# Patient Record
Sex: MALE | Race: WHITE | HISPANIC OR LATINO | Employment: FULL TIME | URBAN - METROPOLITAN AREA
[De-identification: names, ages, dates, MRNs, and addresses within clinical notes are randomized per-mention and may not be internally consistent; named-entity substitution may affect disease eponyms.]

---

## 2018-06-01 ENCOUNTER — OFFICE VISIT (OUTPATIENT)
Dept: URGENT CARE | Facility: CLINIC | Age: 53
End: 2018-06-01
Payer: OTHER MISCELLANEOUS

## 2018-06-01 VITALS — DIASTOLIC BLOOD PRESSURE: 100 MMHG | TEMPERATURE: 97 F | HEART RATE: 80 BPM | SYSTOLIC BLOOD PRESSURE: 150 MMHG

## 2018-06-01 DIAGNOSIS — S40.022A CONTUSION OF LEFT UPPER ARM, INITIAL ENCOUNTER: ICD-10-CM

## 2018-06-01 DIAGNOSIS — M79.602 PAIN OF LEFT UPPER EXTREMITY: Primary | ICD-10-CM

## 2018-06-01 DIAGNOSIS — S50.12XA CONTUSION OF LEFT FOREARM, INITIAL ENCOUNTER: ICD-10-CM

## 2018-06-01 DIAGNOSIS — S50.02XA CONTUSION OF LEFT ELBOW, INITIAL ENCOUNTER: ICD-10-CM

## 2018-06-01 PROCEDURE — 99203 OFFICE O/P NEW LOW 30 MIN: CPT | Mod: S$GLB,,, | Performed by: NURSE PRACTITIONER

## 2018-06-01 RX ORDER — ACETAMINOPHEN 325 MG/1
650 TABLET ORAL EVERY 4 HOURS PRN
COMMUNITY
Start: 2018-06-01

## 2018-06-01 NOTE — LETTER
[facility]  [address], [address]  [facility] [address]  Phone: [phone]  Fax: [phone]    Pt Name: Marcus Steinberg  Injury Date: 05/25/2018   Employee ID: [id number] Date of First Treatment: 06/01/2018   Company: [employer]            Appointment Time: 11:35 AM Arrived: 11:50 AM CDT   Physician: Janina Portillo NP Time Out:14:40 PM       Office Treatment: Marcus was seen today for arm pain.    Diagnoses and all orders for this visit:    Pain of left upper extremity  -     X-Ray Elbow Complete Left  -     acetaminophen (TYLENOL) 325 MG tablet; Take 2 tablets (650 mg total) by mouth every 4 (four) hours as needed for Pain.    Contusion of left upper arm, initial encounter  Contusion of left forearm, initial encounter  Contusion of left elbow, initial encounter       Patient Instructions: Attention not to aggravate affected area, Daily home exercises/warm soaks    Restrictions: Limited use of left hand and arm (No lifting over 5 lbs with left arm.)       Return Appointment: 6/8/2018 at 0900

## 2018-06-01 NOTE — PROGRESS NOTES
Subjective:       Patient ID: Marcus Steinberg is a 53 y.o. male.    Chief Complaint: Arm Pain (right)    New work injury (doi 5/25/2018) Pt reports catching a falling bag of frozen chicken nuggets. He c/o left upper arm pain where the 50 lb box of frozen nuggets hit his left arm. He applied icy hot and wrapped it with an ACE bandage with some relief but still having pain a week later. He is right handed. No previous injury.      Arm Pain    The incident occurred 5 to 7 days ago. The incident occurred at work. The pain is present in the upper left arm. The pain radiates to the left arm. Pertinent negatives include no chest pain.     Review of Systems   Constitution: Negative for chills and fever.   HENT: Negative for sore throat.    Eyes: Negative for blurred vision.   Cardiovascular: Negative for chest pain.   Respiratory: Negative for shortness of breath.    Skin: Negative for rash.   Musculoskeletal: Negative for back pain and joint pain.   Gastrointestinal: Negative for abdominal pain, diarrhea, nausea and vomiting.   Neurological: Negative for headaches.   Psychiatric/Behavioral: The patient is not nervous/anxious.    All other systems reviewed and are negative.      Objective:      Physical Exam   Constitutional: He is oriented to person, place, and time. He appears well-developed and well-nourished. No distress.   HENT:   Right Ear: External ear normal.   Left Ear: External ear normal.   Nose: Nose normal.   Eyes: Conjunctivae are normal.   Cardiovascular: Normal rate, regular rhythm, normal heart sounds and intact distal pulses.    Pulmonary/Chest: Effort normal and breath sounds normal.   Abdominal: Soft. Bowel sounds are normal.   Musculoskeletal: He exhibits tenderness.        Left shoulder: Normal.        Left elbow: He exhibits normal range of motion, no swelling and no deformity. No tenderness found.        Left upper arm: He exhibits tenderness and swelling. He exhibits no deformity.        Arms:  C/o  pain to left upper arm just proximal and lateral to ACF  (ulnar side). Also c/o pain to left forearm just distal to elbow (radial side). No ecchymosis. TTP over both areas. FROM to elbow without elbow pain. Mild swelling to left upper arm & ulnar side of ACF but no defect palpated or observed. Good motor strength. Neurovascular intact distally.   Neurological: He is alert and oriented to person, place, and time.   Skin: Skin is warm and dry. Capillary refill takes less than 2 seconds. No erythema.   Psychiatric: He has a normal mood and affect. His behavior is normal.       Assessment:       1. Pain of left upper extremity    2. Contusion of left upper arm, initial encounter    3. Contusion of left forearm, initial encounter    4. Contusion of left elbow, initial encounter        Plan:       X-ray Elbow Complete Left    Result Date: 6/1/2018  EXAMINATION: XR ELBOW COMPLETE 3 VIEW LEFT CLINICAL HISTORY: trauma; Pain in left arm TECHNIQUE: AP, lateral, and oblique views of the left elbow were performed. COMPARISON: None FINDINGS: Three views. No significant displacement of the anterior or posterior fat pads.  Anterior humeral line and radiocapitellar line are in appropriate orientation.  No acute displaced fracture or dislocation of the elbow.  No radiopaque foreign body.     1. No acute displaced fracture or dislocation of the elbow. Electronically signed by: Braden Awan MD Date:    06/01/2018 Time:    14:10  BP mildly elevated today. No h/o HTN. Instructed to monitor & follow up with PCP. He saw PCP 1 month ago and was normotensive at that time.    Medications Ordered This Encounter      acetaminophen (TYLENOL) 325 MG tablet          Sig: Take 2 tablets (650 mg total) by mouth every 4 (four) hours as needed for Pain.  Patient Instructions: Attention not to aggravate affected area, Daily home exercises/warm soaks   Restrictions: Limited use of left hand and arm (No lifting over 5 lbs with left arm.)  Follow-up in  about 1 week (around 6/8/2018).

## 2018-06-08 ENCOUNTER — OFFICE VISIT (OUTPATIENT)
Dept: URGENT CARE | Facility: CLINIC | Age: 53
End: 2018-06-08
Payer: OTHER MISCELLANEOUS

## 2018-06-08 VITALS — SYSTOLIC BLOOD PRESSURE: 180 MMHG | DIASTOLIC BLOOD PRESSURE: 100 MMHG

## 2018-06-08 DIAGNOSIS — S50.02XD CONTUSION OF LEFT ELBOW, SUBSEQUENT ENCOUNTER: ICD-10-CM

## 2018-06-08 DIAGNOSIS — S40.022D CONTUSION OF LEFT UPPER ARM, SUBSEQUENT ENCOUNTER: Primary | ICD-10-CM

## 2018-06-08 DIAGNOSIS — M79.602 PAIN OF LEFT UPPER EXTREMITY: ICD-10-CM

## 2018-06-08 DIAGNOSIS — S50.12XD CONTUSION OF LEFT FOREARM, SUBSEQUENT ENCOUNTER: ICD-10-CM

## 2018-06-08 PROCEDURE — 99213 OFFICE O/P EST LOW 20 MIN: CPT | Mod: S$GLB,,, | Performed by: NURSE PRACTITIONER

## 2018-06-08 NOTE — LETTER
[facility]  [address], [address]  [facility] [address]  Phone: [phone]  Fax: [phone]    Pt Name: Marcus Steinberg  Injury Date: 05/25/2018   Employee ID:  Date of First Treatment: 06/19/2018   Company: [employer]            Appointment Time: 09:15 AM Arrived:  9:30 AM CDT   Appointment Date: [unfilled] Time Out: 11:00a   Physician: Janina Portillo NP        Office Treatment: Marcus was seen today for arm pain.    Diagnoses and all orders for this visit:    Contusion of left upper arm, subsequent encounter  Contusion of left forearm, subsequent encounter  Pain of left upper extremity  Contusion of left elbow, subsequent encounter       Patient Instructions: Attention not to aggravate affected area, Daily home exercises/warm soaks    Restrictions: Limited use of left hand and arm (No lifting over 5 # with left arm.)       Return Appointment: 6/18/2018 at 11:30 A

## 2018-06-08 NOTE — PROGRESS NOTES
Subjective:       Patient ID: Marcus Steinberg is a 53 y.o. male.    Chief Complaint: Arm Pain (Left)    F/u for left arm pain (5/25/18) Pt c/o left upper arm, and forearm pain with use. He states he tried to lift a box and it was too painful. Pt takes tylenol with mild relief. ajd No pain at rest.      Arm Pain    The incident occurred more than 1 week ago. The incident occurred at work. The injury mechanism was a direct blow. The pain is present in the left forearm and upper left arm. The pain is moderate. The pain has been intermittent since the incident. Pertinent negatives include no chest pain. The symptoms are aggravated by lifting. He has tried acetaminophen for the symptoms. The treatment provided mild relief.     Review of Systems   Constitution: Negative for chills and fever.   HENT: Negative for sore throat.    Eyes: Negative for blurred vision.   Cardiovascular: Negative for chest pain.   Respiratory: Negative for shortness of breath.    Skin: Negative for rash.   Musculoskeletal: Positive for joint swelling. Negative for back pain and joint pain.   Gastrointestinal: Negative for abdominal pain, diarrhea, nausea and vomiting.   Neurological: Negative for headaches.   Psychiatric/Behavioral: The patient is not nervous/anxious.    All other systems reviewed and are negative.      Objective:      Physical Exam   Constitutional: He is oriented to person, place, and time. He appears well-developed and well-nourished. No distress.   HENT:   Right Ear: External ear normal.   Left Ear: External ear normal.   Nose: Nose normal.   Eyes: Conjunctivae are normal.   Cardiovascular: Normal rate, intact distal pulses and normal pulses.    BP remains elevated. Pt has been checking it at [employer] and says it has been a little high. He is asymptomatic. Instructed him to call PCP for evaluation and treatment.   Pulmonary/Chest: Effort normal.   Musculoskeletal: Normal range of motion. He exhibits tenderness.        Left  elbow: He exhibits swelling. He exhibits normal range of motion. Tenderness found.        Arms:  Continues to have pain with palpation to LUE ulnar side and left forearm radial side. Mild swelling to left ACF ulnar side. No pain at rest. Pain with lifting. Good strength but pain with motor testing. Neurovascular intact distally.   Neurological: He is alert and oriented to person, place, and time.   Skin: Skin is warm and dry. Capillary refill takes less than 2 seconds. No erythema.   Psychiatric: He has a normal mood and affect. His behavior is normal.       Assessment:       1. Contusion of left upper arm, subsequent encounter    2. Contusion of left forearm, subsequent encounter    3. Pain of left upper extremity    4. Contusion of left elbow, subsequent encounter        Plan:       Dr Randall also assessed pt today.  If no improvement will consider MRI.    Patient to follow up with PCP regarding elevated BP.  Patient Instructions: Attention not to aggravate affected area, Daily home exercises/warm soaks   Restrictions: Limited use of left hand and arm (No lifting over 5 # with left arm.)  Follow-up in about 10 days (around 6/18/2018).

## 2018-06-08 NOTE — LETTER
[facility]  [address], [address]  [facility] [address]  Phone: [phone]  Fax: [phone]    Pt Name: Marcus Steinberg  Injury Date: 05/25/2018   Employee ID: [id number] Date: 06/08/2018   Company: [employer]            Appointment Time: 09:30 AM Arrived:  8:52 AM AM CDT   Physician: Janina Portillo NP Time out: 10:34 AM       Office Treatment: Marcus was seen today for arm pain.    Diagnoses and all orders for this visit:    Contusion of left upper arm, subsequent encounter  Contusion of left forearm, subsequent encounter  Pain of left upper extremity  Contusion of left elbow, subsequent encounter     Patient Instructions: Attention not to aggravate affected area, Daily home exercises/warm soaks      Restrictions: Limited use of left hand and arm   (No lifting over 5 # with left arm.)       Return Appointment: 6/18/2018 at 10:00 AM

## 2018-06-18 ENCOUNTER — OFFICE VISIT (OUTPATIENT)
Dept: URGENT CARE | Facility: CLINIC | Age: 53
End: 2018-06-18
Payer: OTHER MISCELLANEOUS

## 2018-06-18 VITALS — DIASTOLIC BLOOD PRESSURE: 110 MMHG | SYSTOLIC BLOOD PRESSURE: 180 MMHG

## 2018-06-18 DIAGNOSIS — M79.602 PAIN OF LEFT UPPER EXTREMITY: ICD-10-CM

## 2018-06-18 DIAGNOSIS — S50.12XD CONTUSION OF LEFT FOREARM, SUBSEQUENT ENCOUNTER: ICD-10-CM

## 2018-06-18 DIAGNOSIS — S40.022D CONTUSION OF LEFT UPPER ARM, SUBSEQUENT ENCOUNTER: Primary | ICD-10-CM

## 2018-06-18 PROCEDURE — 99213 OFFICE O/P EST LOW 20 MIN: CPT | Mod: S$GLB,,, | Performed by: NURSE PRACTITIONER

## 2018-06-18 NOTE — PROGRESS NOTES
"Subjective:       Patient ID: Marcus Steinberg is a 53 y.o. male.    Chief Complaint: Arm Pain (left)    F/u for left arm pain (5/25/18) Pt states his arm is "a little better" He reports less pain in bicep. The pain is worse in his forearm. He takes tylenol every other day with moderate relief. ajd Still has pain with lifting. No pain at rest. Continues to work light duty. Has not seen PCP yet for BP. Says PCP is out of town and he will see her next week. Has been asymptomatic. No H/A, No CP, no SOB, no weakness. MWT      Arm Pain    The incident occurred more than 1 week ago. The incident occurred at work. The injury mechanism was a direct blow. The pain is present in the upper left arm and left forearm. The quality of the pain is described as aching. The pain does not radiate. The pain is mild. Pertinent negatives include no chest pain. The symptoms are aggravated by lifting. He has tried acetaminophen for the symptoms. The treatment provided moderate relief.     Review of Systems   Constitution: Negative for chills and fever.   HENT: Negative for sore throat.    Eyes: Negative for blurred vision.   Cardiovascular: Negative for chest pain.   Respiratory: Negative for shortness of breath.    Skin: Negative for rash.   Musculoskeletal: Negative for back pain and joint pain.   Gastrointestinal: Negative for abdominal pain, diarrhea, nausea and vomiting.   Neurological: Negative for headaches.   Psychiatric/Behavioral: The patient is not nervous/anxious.    All other systems reviewed and are negative.      Objective:      Physical Exam   Constitutional: He is oriented to person, place, and time. He appears well-developed and well-nourished. No distress.   HENT:   Right Ear: External ear normal.   Left Ear: External ear normal.   Nose: Nose normal.   Cardiovascular: Normal rate, regular rhythm, normal heart sounds and intact distal pulses.    Pulses:       Radial pulses are 2+ on the right side, and 2+ on the left side. "   Discussed risks of untreated HTN with patient and the need for follow up with PCP or Urgent Care.   Pulmonary/Chest: Effort normal and breath sounds normal.   Musculoskeletal: He exhibits tenderness.        Left elbow: He exhibits decreased range of motion and swelling. No tenderness found.        Left forearm: He exhibits tenderness. He exhibits no swelling and no deformity.        Arms:  Palpable and visible swelling this week to left upper arm ulnar side. Is larger than right arm. He is right handed. No TTP. No pain over elbow. Is TTP to left forearm radial side. Increased pain with lifting and with strength testing.   Neurological: He is alert and oriented to person, place, and time.   Skin: Skin is warm and dry. Capillary refill takes less than 2 seconds. No erythema.   Psychiatric: He has a normal mood and affect. His behavior is normal.   Vitals reviewed.      Assessment:       1. Contusion of left upper arm, subsequent encounter    2. Contusion of left forearm, subsequent encounter    3. Pain of left upper extremity        Plan:       Follow up with PCP or Urgent Care regarding elevated BP. If you start to feel bad (headache, shortness of breath, chest pain, weakness on one side) go the the ER or call 911.      Patient Instructions: Attention not to aggravate affected area, Daily home exercises/warm soaks, MRI to be scheduled once authorized   Restrictions: Limited use of left hand and arm (No lifting over 5 lbs with left hand/arm.)  Follow-up in about 2 weeks (around 7/2/2018).

## 2018-06-18 NOTE — LETTER
[facility]  [address], [address]  [facility] [address]  Phone: [phone]  Fax: [phone]    Pt Name: Marcus Steinberg  Injury Date: 05/25/2018   Employee ID: [id number] Date: 06/18/2018   Company: [employer]            Appointment Time: 10:00 AM Arrived: 10:00 AM CDT   Physician: Janina Portillo NP Time out: 10:48 PM       Office Treatment: Marcus was seen today for arm pain.    Diagnoses and all orders for this visit:    Contusion of left upper arm, subsequent encounter  Contusion of left forearm, subsequent encounter  Pain of left upper extremity     Patient Instructions: Attention not to aggravate affected area, Daily home exercises/warm soaks, MRI to be scheduled once authorized      Restrictions: Limited use of left hand and arm   (No lifting over 5 lbs with left hand/arm.)       Return Appointment: 7/2/2018 at 10:00 AM

## 2018-07-02 ENCOUNTER — OFFICE VISIT (OUTPATIENT)
Dept: URGENT CARE | Facility: CLINIC | Age: 53
End: 2018-07-02
Payer: OTHER MISCELLANEOUS

## 2018-07-02 VITALS — DIASTOLIC BLOOD PRESSURE: 105 MMHG | HEART RATE: 85 BPM | SYSTOLIC BLOOD PRESSURE: 163 MMHG

## 2018-07-02 DIAGNOSIS — S50.12XD CONTUSION OF LEFT FOREARM, SUBSEQUENT ENCOUNTER: ICD-10-CM

## 2018-07-02 DIAGNOSIS — M79.602 PAIN OF LEFT UPPER EXTREMITY: ICD-10-CM

## 2018-07-02 DIAGNOSIS — S40.022D CONTUSION OF LEFT UPPER ARM, SUBSEQUENT ENCOUNTER: Primary | ICD-10-CM

## 2018-07-02 PROCEDURE — 99213 OFFICE O/P EST LOW 20 MIN: CPT | Mod: S$GLB,,, | Performed by: NURSE PRACTITIONER

## 2018-07-02 NOTE — LETTER
[facility]  [address], [address]  [facility] [address]  Phone: [phone]  Fax: [phone]    Pt Name: Marcus Steinberg  Injury Date: 05/25/2018   Employee ID:  Date of Treatment: 07/02/2018   Company: [employer]            Appointment Time: 09:45 AM Arrived: 10:00 AM CDT   Physician: Janina Portillo NP Time Out:11:25       Office Treatment: Marcus was seen today for arm pain.    Diagnoses and all orders for this visit:    Contusion of left upper arm, subsequent encounter    Contusion of left forearm, subsequent encounter    Pain of left upper extremity       Patient Instructions: Daily home exercises/warm soaks (MRI scheduled 7/11/18.)    Restrictions: Limited use of left hand and arm (No lifting over 5 lbs with left hand/arm.)       Return Appointment: 7/13/2018 at 10:00 AM

## 2018-07-02 NOTE — PROGRESS NOTES
Subjective:       Patient ID: Marcus Steinberg is a 53 y.o. male.    Chief Complaint: Arm Pain (right bicep)    Patient returns with pain to his left elbow area.  States he is unable to lift heavy objects, He is scheduled to have MRI next week. 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 Pain persists to left ACF, forearm and upper arm. MWT      Arm Injury    Incident onset: 05/25/2018. The incident occurred at work. The injury mechanism was a direct blow. The pain is present in the left elbow. The quality of the pain is described as burning and aching. The pain is at a severity of 5/10. The pain is moderate. The pain has been constant since the incident. Pertinent negatives include no chest pain or numbness. The symptoms are aggravated by movement and lifting. He has tried NSAIDs and ice for the symptoms. The treatment provided mild relief.     Review of Systems   Constitution: Negative for chills, fever and weakness.   HENT: Negative for congestion, ear pain and nosebleeds.    Eyes: Negative for blurred vision and pain.   Cardiovascular: Negative for chest pain and palpitations.   Respiratory: Negative for cough, shortness of breath and wheezing.    Skin: Negative for dry skin, itching and rash.   Musculoskeletal: Positive for joint pain, muscle weakness and stiffness. Negative for arthritis, back pain, gout, joint swelling and neck pain.   Gastrointestinal: Negative for abdominal pain, constipation, diarrhea, nausea and vomiting.   Genitourinary: Negative for dysuria, frequency and hematuria.   Neurological: Negative for dizziness, headaches, numbness and seizures.   Allergic/Immunologic: Negative for hives.   All other systems reviewed and are negative.      Objective:      Physical Exam   Constitutional: He is oriented to person, place, and time. He appears well-developed and well-nourished. No distress.   HENT:   Right Ear: External ear normal.   Nose: Nose normal.   Eyes:  Conjunctivae are normal.   Cardiovascular: Intact distal pulses.    BP remains elevated. Pt is asymptomatic.    Pulmonary/Chest: Effort normal.   Musculoskeletal: Normal range of motion. He exhibits tenderness.        Left elbow: He exhibits swelling. He exhibits normal range of motion and no deformity. Tenderness found.        Arms:  Pain persists to left upper arm and forearm and ACF. Mild swelling to ulnar aspect of left ACF. FROM with c/o pain. Good strength. Reports occasional numbness left forearm.   Neurological: He is alert and oriented to person, place, and time.   Skin: Skin is warm and dry. Capillary refill takes less than 2 seconds. No erythema.   Psychiatric: He has a normal mood and affect. His behavior is normal.       Assessment:       1. Contusion of left upper arm, subsequent encounter    2. Contusion of left forearm, subsequent encounter    3. Pain of left upper extremity        Plan:     Follow up with PCP today as scheduled regarding elevated BP.      MRI 7/11/18 12 noon.  Patient Instructions: Daily home exercises/warm soaks (MRI scheduled 7/11/18.)   Restrictions: Limited use of left hand and arm (No lifting over 5 lbs with left hand/arm.)  Follow-up in about 11 days (around 7/13/2018).

## 2018-07-13 ENCOUNTER — OFFICE VISIT (OUTPATIENT)
Dept: URGENT CARE | Facility: CLINIC | Age: 53
End: 2018-07-13
Payer: OTHER MISCELLANEOUS

## 2018-07-13 VITALS — DIASTOLIC BLOOD PRESSURE: 92 MMHG | SYSTOLIC BLOOD PRESSURE: 144 MMHG

## 2018-07-13 DIAGNOSIS — S40.022D CONTUSION OF LEFT UPPER ARM, SUBSEQUENT ENCOUNTER: Primary | ICD-10-CM

## 2018-07-13 DIAGNOSIS — S50.12XD CONTUSION OF LEFT FOREARM, SUBSEQUENT ENCOUNTER: ICD-10-CM

## 2018-07-13 PROCEDURE — 99213 OFFICE O/P EST LOW 20 MIN: CPT | Mod: S$GLB,,, | Performed by: NURSE PRACTITIONER

## 2018-07-13 NOTE — PROGRESS NOTES
Subjective:       Patient ID: Marcus Steinberg is a 53 y.o. male.    Chief Complaint: Arm Pain (left)    Patient follows up today related to his left arm pain. MRI report in chart. FJD  Says he has been working. At times he has to lift using left arm. Pain level gradually improving but continues to have pain left forearm and ACF.  Has appt with PCP Monday regarding BP. Has been getting BP checked regularly at PCP's office. MWT      Arm Pain    Incident onset: 05/25/2018. The incident occurred at work. The injury mechanism was twisted. The pain is present in the left elbow. The quality of the pain is described as aching and burning. The pain does not radiate. The pain is at a severity of 7/10. The pain is moderate. The pain has been constant since the incident. Pertinent negatives include no chest pain or numbness. The symptoms are aggravated by lifting. The treatment provided no relief.     Review of Systems   Constitution: Negative for chills, fever and weakness.   HENT: Negative for congestion, ear pain and nosebleeds.    Eyes: Negative for blurred vision and pain.   Cardiovascular: Negative for chest pain and palpitations.   Respiratory: Negative for cough, shortness of breath and wheezing.    Skin: Negative for dry skin, itching and rash.   Musculoskeletal: Positive for joint pain and muscle weakness. Negative for arthritis, back pain, gout, joint swelling, neck pain and stiffness.   Gastrointestinal: Negative for abdominal pain, constipation, diarrhea, nausea and vomiting.   Genitourinary: Negative for dysuria, frequency and hematuria.   Neurological: Negative for dizziness, headaches, numbness and seizures.   Allergic/Immunologic: Negative for hives.   All other systems reviewed and are negative.      Objective:      Physical Exam   Constitutional: He is oriented to person, place, and time. He appears well-developed and well-nourished. No distress.   HENT:   Right Ear: External ear normal.   Left Ear: External ear  normal.   Nose: Nose normal.   Eyes: Conjunctivae are normal.   Cardiovascular: Intact distal pulses.    Pulmonary/Chest: Effort normal.   Musculoskeletal:        Left elbow: He exhibits normal range of motion. No tenderness found.        Left forearm: He exhibits tenderness and swelling.        Arms:  Cont to have pain left forearm and mild swelling left ACF. More pain with flexion and pronation. Neurovascular intact distally.  Reviewed normal MRI findings with patient.   Neurological: He is alert and oriented to person, place, and time.   Skin: Skin is warm and dry. Capillary refill takes less than 2 seconds. No erythema.   Psychiatric: He has a normal mood and affect. His behavior is normal.       Assessment:       1. Contusion of left upper arm, subsequent encounter    2. Contusion of left forearm, subsequent encounter        Plan:            Patient Instructions: Daily home exercises/warm soaks, PT to be scheduled once authorized   Restrictions: Limited use of left hand and arm (No Lifting over 5 lbs with left hand/arm.)  Follow-up in about 1 week (around 7/20/2018).

## 2018-07-13 NOTE — LETTER
[facility]  [address], [address]  [facility] [address]  Phone: [phone]  Fax: [phone]    Pt Name: Marcus Steinberg  Injury Date: 05/25/2018   Employee ID: [id number] Date of Treatment: 07/13/2018   Company: [employer]            Appointment Time: 09:15 AM Arrived:  9:30 AM CDT   Physician: Janina Portillo NP Time Out:11:15 AM           Office Treatment: Marcus was seen today for arm pain.    Diagnoses and all orders for this visit:    Contusion of left upper arm, subsequent encounter    Contusion of left forearm, subsequent encounter       Patient Instructions: Daily home exercises/warm soaks, PT to be scheduled once authorized    Restrictions: Limited use of left hand and arm (No Lifting over 5 lbs with left hand/arm.)       Return Appointment: 7/20/2018 at 11:00 AM

## 2018-07-20 ENCOUNTER — OFFICE VISIT (OUTPATIENT)
Dept: URGENT CARE | Facility: CLINIC | Age: 53
End: 2018-07-20
Payer: OTHER MISCELLANEOUS

## 2018-07-20 DIAGNOSIS — M79.602 PAIN OF LEFT UPPER EXTREMITY: ICD-10-CM

## 2018-07-20 DIAGNOSIS — S40.022D CONTUSION OF LEFT UPPER ARM, SUBSEQUENT ENCOUNTER: Primary | ICD-10-CM

## 2018-07-20 DIAGNOSIS — S50.02XD CONTUSION OF LEFT ELBOW, SUBSEQUENT ENCOUNTER: ICD-10-CM

## 2018-07-20 DIAGNOSIS — S50.12XD CONTUSION OF LEFT FOREARM, SUBSEQUENT ENCOUNTER: ICD-10-CM

## 2018-07-20 PROCEDURE — 99213 OFFICE O/P EST LOW 20 MIN: CPT | Mod: S$GLB,,, | Performed by: PREVENTIVE MEDICINE

## 2018-07-20 NOTE — PROGRESS NOTES
D.O.I.- 5/25/2018 Patient is here for a return visit. Pt. States pain level is improving, pain level is a 5 with occasional use. Therapy has been approved pending scheduling.

## 2018-07-20 NOTE — LETTER
[facility]  [address], [address]  [place] [address]  Phone: [phone]  Fax: [phone]    Pt Name: Marcus Steinberg  Injury Date: 05/25/2018   Employee ID: [id number] Date: 07/20/2018   Company: [employer]            Appointment Time: 11:30 AM Arrived: 10:56 AM CDT   Physician: Cordell Randall MD Time out: 11:59 AM       Office Treatment: Marcus was seen today for arm pain.    Diagnoses and all orders for this visit:    Contusion of left upper arm, subsequent encounter    Contusion of left forearm, subsequent encounter    Contusion of left elbow, subsequent encounter    Pain of left upper extremity       Patient Instructions: Daily home exercises/warm soaks, Begin Physical Therapy (Continue Tylenol for pain as needed.)      Restrictions: Limited use of left hand and arm       Return Appointment: 7/27/2018 at 10:30 AM

## 2018-07-20 NOTE — PROGRESS NOTES
Subjective:       Patient ID: Marcus Steinberg is a 53 y.o. male.    Chief Complaint: Arm Pain                 D.O.I.- 5/25/2018 Patient is here for a return visit. Pt. States pain level is improving, pain level is a 5 with occasional use. Therapy has been approved pending scheduling                  Arm Pain    The incident occurred more than 1 week ago. The incident occurred at work. The injury mechanism was a direct blow. The pain is present in the left elbow and left forearm. The quality of the pain is described as aching. The pain does not radiate. The pain is at a severity of 5/10. The pain has been fluctuating since the incident. Pertinent negatives include no chest pain. The symptoms are aggravated by lifting. He has tried acetaminophen for the symptoms. The treatment provided mild relief.     Review of Systems   Constitution: Negative for chills and fever.   HENT: Negative for sore throat.    Eyes: Negative for blurred vision.   Cardiovascular: Negative for chest pain.   Respiratory: Negative for shortness of breath.    Skin: Negative for rash.   Musculoskeletal: Positive for myalgias. Negative for back pain and joint pain.   Gastrointestinal: Negative for abdominal pain, diarrhea, nausea and vomiting.   Neurological: Negative for headaches.   Psychiatric/Behavioral: The patient is not nervous/anxious.    All other systems reviewed and are negative.      Objective:      Physical Exam   Constitutional: He is oriented to person, place, and time. He appears well-developed and well-nourished.   HENT:   Head: Normocephalic.   Eyes: Pupils are equal, round, and reactive to light.   Neck: Normal range of motion.   Cardiovascular: Normal rate.    Pulmonary/Chest: Effort normal.   Musculoskeletal:        Left elbow: He exhibits decreased range of motion and effusion. He exhibits no swelling, no deformity and no laceration. Tenderness found. No radial head, no medial epicondyle, no lateral epicondyle and no olecranon  process tenderness noted.        Arms:  Pain about left elbow persists with palpation only. Pain with full flexion, extension, supination and pronation of left elbow.    Neurological: He is alert and oriented to person, place, and time.   Skin: Skin is warm and dry.   Psychiatric: He has a normal mood and affect.   Nursing note and vitals reviewed.      Assessment:       1. Contusion of left upper arm, subsequent encounter    2. Contusion of left forearm, subsequent encounter    3. Contusion of left elbow, subsequent encounter    4. Pain of left upper extremity        Plan:            Patient Instructions: Daily home exercises/warm soaks, Begin Physical Therapy (Continue Tylenol for pain as needed.)   Restrictions: Limited use of left hand and arm  Follow-up in about 1 week (around 7/27/2018).

## 2018-07-27 ENCOUNTER — OFFICE VISIT (OUTPATIENT)
Dept: URGENT CARE | Facility: CLINIC | Age: 53
End: 2018-07-27
Payer: OTHER MISCELLANEOUS

## 2018-07-27 VITALS — DIASTOLIC BLOOD PRESSURE: 90 MMHG | SYSTOLIC BLOOD PRESSURE: 160 MMHG

## 2018-07-27 DIAGNOSIS — S50.12XD CONTUSION OF LEFT FOREARM, SUBSEQUENT ENCOUNTER: ICD-10-CM

## 2018-07-27 DIAGNOSIS — S40.022D CONTUSION OF LEFT UPPER ARM, SUBSEQUENT ENCOUNTER: Primary | ICD-10-CM

## 2018-07-27 PROCEDURE — 99213 OFFICE O/P EST LOW 20 MIN: CPT | Mod: S$GLB,,, | Performed by: NURSE PRACTITIONER

## 2018-07-27 NOTE — LETTER
[facility]  [address], [address]  [place] [address]  Phone: [phone]  Fax: [phone]    Pt Name: Marcus Steinberg  Injury Date: 05/25/2018   Employee ID: [id number] Date: 07/27/2018   Company: [email or web address]            Appointment Time: 10:30 AM Arrived: 10:30 AM CDT   Physician: Janina Portillo NP tIME OUT: 11:52 am       Office Treatment: Marcus was seen today for arm pain.    Diagnoses and all orders for this visit:    Contusion of left upper arm, subsequent encounter    Contusion of left forearm, subsequent encounter       Patient Instructions: Attention not to aggravate affected area, Daily home exercises/warm soaks, Begin Physical Therapy (Take OTC Tylenol for pain as needed.)      Restrictions: Limited use of left hand and arm       Return Appointment: 8/10/2018 at 10:00 am

## 2018-07-27 NOTE — PROGRESS NOTES
Subjective:       Patient ID: Marcus Steinberg is a 53 y.o. male.    Chief Complaint: Arm Pain    F/u for left arm pain (5/25/18) Pt states his arm is getting better. Not much pain, just a little in the bicep. ajd He is concerned that is left forearm looks different than the right. Mild pain. Has followed up with PCP re: HTN. Taking medication. MWT      Arm Pain    The incident occurred more than 1 week ago. The incident occurred at work. The injury mechanism was a direct blow. The pain is present in the upper left arm. The pain is mild. The pain has been intermittent since the incident. Pertinent negatives include no chest pain. The symptoms are aggravated by lifting.     Review of Systems   Constitution: Negative for chills and fever.   HENT: Negative for sore throat.    Eyes: Negative for blurred vision.   Cardiovascular: Negative for chest pain.   Respiratory: Negative for shortness of breath.    Skin: Negative for rash.   Musculoskeletal: Negative for back pain and joint pain.   Gastrointestinal: Negative for abdominal pain, diarrhea, nausea and vomiting.   Neurological: Negative for headaches.   Psychiatric/Behavioral: The patient is not nervous/anxious.    All other systems reviewed and are negative.      Objective:      Physical Exam   Constitutional: He is oriented to person, place, and time. He appears well-developed and well-nourished. No distress.   HENT:   Right Ear: External ear normal.   Left Ear: External ear normal.   Eyes: Conjunctivae are normal.   Cardiovascular: Intact distal pulses.    Pulmonary/Chest: Effort normal.   Musculoskeletal:        Left elbow: He exhibits normal range of motion, no swelling and no deformity. No tenderness found.        Left forearm: He exhibits no tenderness.        Arms:  Mild pain to left forearm and left biceps. Increased pain with lifting and ROM. Left forearm does look slightly larger than right. No pain to elbow. Good strength, strong .   Neurological: He is  alert and oriented to person, place, and time.   Skin: Skin is warm and dry. Capillary refill takes less than 2 seconds. No erythema.   Psychiatric: He has a normal mood and affect. His behavior is normal.   Vitals reviewed.      Assessment:       1. Contusion of left upper arm, subsequent encounter    2. Contusion of left forearm, subsequent encounter        Plan:            Patient Instructions: Attention not to aggravate affected area, Daily home exercises/warm soaks, Begin Physical Therapy (Take OTC Tylenol for pain as needed.)   Restrictions: Limited use of left hand and arm  Follow-up in about 2 weeks (around 8/10/2018).

## 2018-08-10 ENCOUNTER — OFFICE VISIT (OUTPATIENT)
Dept: URGENT CARE | Facility: CLINIC | Age: 53
End: 2018-08-10
Payer: OTHER MISCELLANEOUS

## 2018-08-10 DIAGNOSIS — S40.022D CONTUSION OF LEFT UPPER ARM, SUBSEQUENT ENCOUNTER: Primary | ICD-10-CM

## 2018-08-10 DIAGNOSIS — M79.602 PAIN OF LEFT UPPER EXTREMITY: ICD-10-CM

## 2018-08-10 DIAGNOSIS — S50.02XD CONTUSION OF LEFT ELBOW, SUBSEQUENT ENCOUNTER: ICD-10-CM

## 2018-08-10 DIAGNOSIS — S50.12XD CONTUSION OF LEFT FOREARM, SUBSEQUENT ENCOUNTER: ICD-10-CM

## 2018-08-10 PROCEDURE — 99213 OFFICE O/P EST LOW 20 MIN: CPT | Mod: S$GLB,,, | Performed by: NURSE PRACTITIONER

## 2018-08-10 NOTE — PROGRESS NOTES
Subjective:       Patient ID: Marcus Steinberg is a 53 y.o. male.    Chief Complaint: Arm Pain    F/u for left arm pain (5/25/18) Pt continues to c/o mild pain to upper left arm. PT was delayed due to [facility] scheduling a  to accompany Mr Steinberg to his therapy appt. Initial eval is Monday, August 13th. ajd      Arm Pain    The incident occurred more than 1 week ago. The incident occurred at work. The injury mechanism was a direct blow. The pain is present in the upper left arm. The pain is mild. Pertinent negatives include no chest pain.     Review of Systems   Constitution: Negative for chills and fever.   HENT: Negative for sore throat.    Eyes: Negative for blurred vision.   Cardiovascular: Negative for chest pain.   Respiratory: Negative for shortness of breath.    Skin: Negative for rash.   Musculoskeletal: Negative for back pain and joint pain.   Gastrointestinal: Negative for abdominal pain, diarrhea, nausea and vomiting.   Neurological: Negative for headaches.   Psychiatric/Behavioral: The patient is not nervous/anxious.    All other systems reviewed and are negative.      Objective:      Physical Exam   Constitutional: He is oriented to person, place, and time. He appears well-developed and well-nourished. No distress.   HENT:   Right Ear: External ear normal.   Left Ear: External ear normal.   Nose: Nose normal.   Eyes: Conjunctivae are normal.   Cardiovascular: Intact distal pulses.    Pulmonary/Chest: Effort normal.   Musculoskeletal: Normal range of motion. He exhibits tenderness.        Left elbow: He exhibits normal range of motion and no swelling. Tenderness found. No olecranon process tenderness noted.        Left upper arm: He exhibits tenderness. He exhibits no swelling.        Left forearm: He exhibits no tenderness and no swelling.        Arms:  Swelling to left bicep has decreased since last visit. Still has tenderness and pain with lifting. C/o pain over left elbow today. No pain over  lateral or medial epicondyl. Pain to left forearm has subsided.    Neurological: He is alert and oriented to person, place, and time.   Skin: Skin is warm and dry. Capillary refill takes less than 2 seconds.   Psychiatric: He has a normal mood and affect. His behavior is normal.       Assessment:       1. Contusion of left upper arm, subsequent encounter    2. Contusion of left forearm, subsequent encounter    3. Contusion of left elbow, subsequent encounter    4. Pain of left upper extremity        Plan:       Pt has P.T. Evaluation set up for Monday with a  scheduled to be present.      Patient Instructions: Attention not to aggravate affected area, Daily home exercises/warm soaks, Begin Physical Therapy   Restrictions: Limited use of left hand and arm  Follow-up in about 2 weeks (around 8/24/2018).

## 2018-08-10 NOTE — LETTER
[facility]  [address], [address]  [place] [address]  Phone: [phone]  Fax: [phone]    Pt Name: Marcus Steinberg  Injury Date: 05/25/2018   Employee ID: [id number] Date of First Treatment: 08/10/2018   Company: [employer]            Appointment Time: 10:00 AM Arrived: 10:00 AM CDT   Physician: Janina Portillo NP Time out: 11:21 AM       Office Treatment: Marcus was seen today for arm pain.    Diagnoses and all orders for this visit:    Contusion of left upper arm, subsequent encounter    Contusion of left forearm, subsequent encounter    Contusion of left elbow, subsequent encounter    Pain of left upper extremity       Patient Instructions: Attention not to aggravate affected area, Daily home exercises/warm soaks, Begin Physical Therapy      Restrictions: Limited use of left hand and arm       Return Appointment: 8/24/2018 at 10:00 AM

## 2018-08-24 ENCOUNTER — OFFICE VISIT (OUTPATIENT)
Dept: URGENT CARE | Facility: CLINIC | Age: 53
End: 2018-08-24
Payer: OTHER MISCELLANEOUS

## 2018-08-24 DIAGNOSIS — S40.022D CONTUSION OF LEFT UPPER ARM, SUBSEQUENT ENCOUNTER: Primary | ICD-10-CM

## 2018-08-24 DIAGNOSIS — S50.02XD CONTUSION OF LEFT ELBOW, SUBSEQUENT ENCOUNTER: ICD-10-CM

## 2018-08-24 DIAGNOSIS — S50.12XD CONTUSION OF LEFT FOREARM, SUBSEQUENT ENCOUNTER: ICD-10-CM

## 2018-08-24 PROCEDURE — 99213 OFFICE O/P EST LOW 20 MIN: CPT | Mod: S$GLB,,, | Performed by: PREVENTIVE MEDICINE

## 2018-08-24 NOTE — PROGRESS NOTES
"Subjective:       Patient ID: Marcus Steinberg is a 53 y.o. male.    Chief Complaint: Arm Pain    F/u for left upper arm pain (5/25/2018) He states "not much pain" He takes tylenol as needed for pain. He's began PT and it's going good. ajd      Review of Systems   Constitution: Negative for chills and fever.   HENT: Negative for sore throat.    Eyes: Negative for blurred vision.   Cardiovascular: Negative for chest pain.   Respiratory: Negative for shortness of breath.    Skin: Negative for rash.   Musculoskeletal: Negative for back pain and joint pain.   Gastrointestinal: Negative for abdominal pain, diarrhea, nausea and vomiting.   Neurological: Negative for headaches.   Psychiatric/Behavioral: The patient is not nervous/anxious.    All other systems reviewed and are negative.      Objective:      Physical Exam   Constitutional: He is oriented to person, place, and time. He appears well-developed and well-nourished.   HENT:   Head: Normocephalic.   Eyes: Pupils are equal, round, and reactive to light.   Neck: Normal range of motion.   Cardiovascular: Normal rate.   Pulmonary/Chest: Effort normal.   Musculoskeletal: Normal range of motion.        Left upper arm: He exhibits tenderness. He exhibits no bony tenderness, no swelling, no edema, no deformity and no laceration.        Arms:  Mild pain with ecchymosis noted about left upper arm. Mild swelling persists. Pulses good.    Full range of motion of left elbow, left shoulder and arm.   Neurological: He is alert and oriented to person, place, and time.   Skin: Skin is warm and dry.   Psychiatric: He has a normal mood and affect.   Nursing note and vitals reviewed.      Assessment:       1. Contusion of left upper arm, subsequent encounter    2. Contusion of left forearm, subsequent encounter    3. Contusion of left elbow, subsequent encounter        Plan:            Patient Instructions: Daily home exercises/warm soaks, Continue Physical Therapy(Finish previously " prescribed physical therapy. Take OTC tylenol or Advil for pain as needed.)   Restrictions: Limited use of left hand and arm  Follow-up in about 2 weeks (around 9/7/2018).

## 2018-08-24 NOTE — LETTER
[facility]  [address], [address]  [place] [address]  Phone: [phone]  Fax: [phone]    Pt Name: Marcus Steinberg  Injury Date: 05/25/2018   Employee ID: [id number] Date 08/24/2018   Company: [employer]            Appointment Time: 10:00 AM Arrived: 10:00 AM CDT   Physician: Cordell Randall MD Time out: 11:06 AM       Office Treatment: Marcus was seen today for arm pain.    Diagnoses and all orders for this visit:    Contusion of left upper arm, subsequent encounter  Contusion of left forearm, subsequent encounter  Contusion of left elbow, subsequent encounter     Patient Instructions: Daily home exercises/warm soaks, Continue Physical Therapy(Finish previously prescribed physical therapy. Take OTC tylenol or Advil for pain as needed.)      Restrictions: Limited use of left hand and arm       Return Appointment: 9/7/2018 @ 10:30 AM

## 2018-09-07 ENCOUNTER — OFFICE VISIT (OUTPATIENT)
Dept: URGENT CARE | Facility: CLINIC | Age: 53
End: 2018-09-07
Payer: OTHER MISCELLANEOUS

## 2018-09-07 DIAGNOSIS — S50.02XD CONTUSION OF LEFT ELBOW, SUBSEQUENT ENCOUNTER: ICD-10-CM

## 2018-09-07 DIAGNOSIS — S50.12XD CONTUSION OF LEFT FOREARM, SUBSEQUENT ENCOUNTER: ICD-10-CM

## 2018-09-07 DIAGNOSIS — M79.602 PAIN OF LEFT UPPER EXTREMITY: ICD-10-CM

## 2018-09-07 DIAGNOSIS — S40.022D CONTUSION OF LEFT UPPER ARM, SUBSEQUENT ENCOUNTER: Primary | ICD-10-CM

## 2018-09-07 PROCEDURE — 99213 OFFICE O/P EST LOW 20 MIN: CPT | Mod: S$GLB,,, | Performed by: PREVENTIVE MEDICINE

## 2018-09-07 NOTE — LETTER
[facility]  [address], [address]  [place] [address]  Phone: [phone]  Fax: [phone]    Pt Name: Marcus Steinberg  Injury Date: 05/25/2018   Employee ID: [id number] Date  09/07/2018   Company: [employer]            Appointment Time: 10:30 AM Arrived: 10:30 AM CDT   Physician: Cordell Randall MD Time out:: 11:21 AM       Office Treatment: Marcus was seen today for arm pain.    Diagnoses and all orders for this visit:    Contusion of left upper arm, subsequent encounter  Contusion of left forearm, subsequent encounter  Contusion of left elbow, subsequent encounter  Pain of left upper extremity       Patient Instructions: Daily home exercises/warm soaks, Continue Physical Therapy(Continue therapy 3X per week for 2 weeks. Take OTC tylenol for pain as needed.)      Restrictions: No lifting/pushing/pulling more than 25 lbs,  Limited use of left hand and arm       Return Appointment: 9/21/2018 at 10:00 AM     [place]

## 2018-09-07 NOTE — PROGRESS NOTES
"Subjective:       Patient ID: Marcus Steinberg is a 53 y.o. male.    Chief Complaint: Arm Pain    F/u for left upper arm pain (5/25/2018) He states "not much pain" He takes tylenol as needed for pain.      Review of Systems   Constitution: Negative for chills and fever.   HENT: Negative for sore throat.    Eyes: Negative for blurred vision.   Cardiovascular: Negative for chest pain.   Respiratory: Negative for shortness of breath.    Skin: Negative for rash.   Musculoskeletal: Negative for back pain and joint pain.   Gastrointestinal: Negative for abdominal pain, diarrhea, nausea and vomiting.   Neurological: Negative for headaches.   Psychiatric/Behavioral: The patient is not nervous/anxious.    All other systems reviewed and are negative.      Objective:      Physical Exam   Constitutional: He is oriented to person, place, and time. He appears well-developed and well-nourished.   HENT:   Head: Normocephalic.   Eyes: Pupils are equal, round, and reactive to light.   Neck: Normal range of motion.   Cardiovascular: Normal rate.   Pulmonary/Chest: Effort normal.   Musculoskeletal: Normal range of motion.        Left upper arm: He exhibits tenderness. He exhibits no bony tenderness, no swelling, no edema, no deformity and no laceration.        Arms:  Mild pain with ecchymosis noted about left upper arm. Mild swelling persists. Pulses good.    Full range of motion of left elbow, left shoulder and arm.   Neurological: He is alert and oriented to person, place, and time.   Skin: Skin is warm and dry.   Psychiatric: He has a normal mood and affect.   Nursing note and vitals reviewed.      Assessment:       1. Contusion of left upper arm, subsequent encounter    2. Contusion of left forearm, subsequent encounter    3. Contusion of left elbow, subsequent encounter    4. Pain of left upper extremity        Plan:            Patient Instructions: Daily home exercises/warm soaks, Continue Physical Therapy(Continue therapy 3X per " week for 2 weeks. Take OTC tylenol for pain as needed.)   Restrictions: No lifting/pushing/pulling more than 25 lbs, Limited use of left hand and arm  Follow-up in about 2 weeks (around 9/21/2018).

## 2018-09-21 ENCOUNTER — OFFICE VISIT (OUTPATIENT)
Dept: URGENT CARE | Facility: CLINIC | Age: 53
End: 2018-09-21
Payer: OTHER MISCELLANEOUS

## 2018-09-21 DIAGNOSIS — S50.02XD CONTUSION OF LEFT ELBOW, SUBSEQUENT ENCOUNTER: ICD-10-CM

## 2018-09-21 DIAGNOSIS — S40.022D CONTUSION OF LEFT UPPER ARM, SUBSEQUENT ENCOUNTER: Primary | ICD-10-CM

## 2018-09-21 DIAGNOSIS — M79.602 PAIN OF LEFT UPPER EXTREMITY: ICD-10-CM

## 2018-09-21 DIAGNOSIS — S50.12XD CONTUSION OF LEFT FOREARM, SUBSEQUENT ENCOUNTER: ICD-10-CM

## 2018-09-21 PROCEDURE — 99213 OFFICE O/P EST LOW 20 MIN: CPT | Mod: S$GLB,,, | Performed by: PREVENTIVE MEDICINE

## 2018-09-21 NOTE — LETTER
[facility]  [address], [address]  [place] [address]  Phone: [phone]  Fax: [phone]    Pt Name: Marcus Steinberg  Injury Date: 05/25/2018   Employee ID: [id number] Date of Treatment: 09/21/2018   Company: [employer]            Appointment Time: 09:45 AM Arrived: 10:00 AM CDT   Physician: Cordell Randall MD Time out: 10:55 PM CDT       Office Treatment: Marcus was seen today for arm pain.    Diagnoses and all orders for this visit:    Contusion of left upper arm, subsequent encounter    Contusion of left forearm, subsequent encounter    Contusion of left elbow, subsequent encounter    Pain of left upper extremity       Patient Instructions: Daily home exercises/warm soaks, Referral to specialist to be scheduled, once authorized, Continue Physical Therapy(Continue OTC medication like Tylenol as needed for pain.)    Restrictions: No lifting/pushing/pulling more than 25 lbs       Return Appointment: 10/5/2018 at 10:30PM

## 2018-09-21 NOTE — PROGRESS NOTES
Subjective:       Patient ID: Marcus Steinberg is a 53 y.o. male.    Chief Complaint: Arm Pain    FOLLOW UP (DOI 5/25/2018) patient states he is doing better. His pain level is a 3/10. He is taking tylenol for pain with moderate relief. He states that PT wants him to go for 2 more weeks. LM      Review of Systems   Constitution: Negative for chills and fever.   HENT: Negative for sore throat.    Eyes: Negative for blurred vision.   Cardiovascular: Negative for chest pain.   Respiratory: Negative for shortness of breath.    Skin: Negative for rash.   Musculoskeletal: Negative for back pain and joint pain.   Gastrointestinal: Negative for abdominal pain, diarrhea, nausea and vomiting.   Neurological: Negative for headaches.   Psychiatric/Behavioral: The patient is not nervous/anxious.    All other systems reviewed and are negative.      Objective:      Physical Exam   Constitutional: He is oriented to person, place, and time. He appears well-developed and well-nourished.   HENT:   Head: Normocephalic.   Eyes: Pupils are equal, round, and reactive to light.   Neck: Normal range of motion.   Cardiovascular: Normal rate.   Pulmonary/Chest: Effort normal.   Musculoskeletal: Normal range of motion.        Left upper arm: He exhibits tenderness. He exhibits no bony tenderness, no swelling, no edema, no deformity and no laceration.        Arms:  Pain persists  about left upper arm and biceps area. No swelling persists. Pulses good.    Full range of motion of left elbow, left shoulder with complaints of pain with flexion, extension of left elbow.   Neurological: He is alert and oriented to person, place, and time.   Skin: Skin is warm and dry.   Psychiatric: He has a normal mood and affect.   Nursing note and vitals reviewed.      Assessment:       1. Contusion of left upper arm, subsequent encounter    2. Contusion of left forearm, subsequent encounter    3. Contusion of left elbow, subsequent encounter    4. Pain of left upper  extremity        Plan:            Patient Instructions: Daily home exercises/warm soaks, Referral to specialist to be scheduled, once authorized, Continue Physical Therapy(Continue OTC medication like Tylenol as needed for pain.)   Restrictions: No lifting/pushing/pulling more than 25 lbs  Follow-up in about 2 weeks (around 10/5/2018).

## 2023-03-28 ENCOUNTER — OFFICE (OUTPATIENT)
Dept: URBAN - METROPOLITAN AREA CLINIC 76 | Facility: CLINIC | Age: 58
Setting detail: OPHTHALMOLOGY
End: 2023-03-28
Payer: COMMERCIAL

## 2023-03-28 DIAGNOSIS — H25.13: ICD-10-CM

## 2023-03-28 DIAGNOSIS — H52.4: ICD-10-CM

## 2023-03-28 DIAGNOSIS — H02.834: ICD-10-CM

## 2023-03-28 DIAGNOSIS — E11.9: ICD-10-CM

## 2023-03-28 DIAGNOSIS — H02.831: ICD-10-CM

## 2023-03-28 DIAGNOSIS — H02.402: ICD-10-CM

## 2023-03-28 PROCEDURE — 92004 COMPRE OPH EXAM NEW PT 1/>: CPT | Performed by: OPHTHALMOLOGY

## 2023-03-28 PROCEDURE — 92015 DETERMINE REFRACTIVE STATE: CPT | Performed by: OPHTHALMOLOGY

## 2023-03-28 ASSESSMENT — LID POSITION - PTOSIS: OS_PTOSIS: 1+

## 2023-03-28 ASSESSMENT — TONOMETRY
OD_IOP_MMHG: 15
OS_IOP_MMHG: 16

## 2023-03-28 ASSESSMENT — KERATOMETRY
OD_K2POWER_DIOPTERS: 45.00
OS_K2POWER_DIOPTERS: 45.50
OS_K1POWER_DIOPTERS: 45.00
OD_K1POWER_DIOPTERS: 45.00
OD_AXISANGLE_DEGREES: 90
OS_AXISANGLE_DEGREES: 82

## 2023-03-28 ASSESSMENT — VISUAL ACUITY
OS_BCVA: 20/25
OD_BCVA: 20/25

## 2023-03-28 ASSESSMENT — REFRACTION_MANIFEST
OS_AXIS: 88
OS_SPHERE: +1.25
OD_AXIS: 91
OD_CYLINDER: -0.75
OD_VA1: 20/20
OS_VA1: 20/20
OD_SPHERE: +1.25
OS_CYLINDER: -0.75
OD_ADD: +2.25
OS_ADD: +2.25

## 2023-03-28 ASSESSMENT — AXIALLENGTH_DERIVED
OS_AL: 22.6926
OS_AL: 22.6476
OD_AL: 22.6424
OD_AL: 22.7325

## 2023-03-28 ASSESSMENT — REFRACTION_AUTOREFRACTION
OD_SPHERE: +1.75
OS_CYLINDER: -1.00
OS_SPHERE: +1.25
OS_AXIS: 88
OD_CYLINDER: -1.25
OD_AXIS: 91

## 2023-03-28 ASSESSMENT — CONFRONTATIONAL VISUAL FIELD TEST (CVF)
OD_FINDINGS: FULL
OS_FINDINGS: FULL

## 2023-03-28 ASSESSMENT — SPHEQUIV_DERIVED
OS_SPHEQUIV: 0.75
OD_SPHEQUIV: 1.125
OS_SPHEQUIV: 0.875
OD_SPHEQUIV: 0.875

## 2023-03-28 ASSESSMENT — LID POSITION - DERMATOCHALASIS
OD_DERMATOCHALASIS: 1+
OS_DERMATOCHALASIS: 1+

## 2023-03-29 ENCOUNTER — OFFICE (OUTPATIENT)
Dept: URBAN - METROPOLITAN AREA CLINIC 76 | Facility: CLINIC | Age: 58
Setting detail: OPHTHALMOLOGY
End: 2023-03-29
Payer: COMMERCIAL

## 2023-03-29 DIAGNOSIS — H25.13: ICD-10-CM

## 2023-03-29 DIAGNOSIS — H02.831: ICD-10-CM

## 2023-03-29 DIAGNOSIS — H52.4: ICD-10-CM

## 2023-03-29 DIAGNOSIS — H02.402: ICD-10-CM

## 2023-03-29 DIAGNOSIS — E11.9: ICD-10-CM

## 2023-03-29 DIAGNOSIS — H02.834: ICD-10-CM

## 2023-03-29 PROCEDURE — GLASS CHEC GLASS RECHECK/ NO CHARGE: Performed by: OPHTHALMOLOGY

## 2023-03-29 PROCEDURE — 92015 DETERMINE REFRACTIVE STATE: CPT | Performed by: OPHTHALMOLOGY

## 2023-03-29 ASSESSMENT — SPHEQUIV_DERIVED
OS_SPHEQUIV: 0.875
OS_SPHEQUIV: 0.75
OD_SPHEQUIV: 1.125
OD_SPHEQUIV: 0.875

## 2023-03-29 ASSESSMENT — VISUAL ACUITY
OD_BCVA: 20/25
OS_BCVA: 20/25

## 2023-03-29 ASSESSMENT — TONOMETRY
OD_IOP_MMHG: 15
OS_IOP_MMHG: 16

## 2023-03-29 ASSESSMENT — LID POSITION - DERMATOCHALASIS
OD_DERMATOCHALASIS: 1+
OS_DERMATOCHALASIS: 1+

## 2023-03-29 ASSESSMENT — REFRACTION_MANIFEST
OD_VA1: 20/20
OS_ADD: +2.25
OD_CYLINDER: -0.75
OS_CYLINDER: -0.75
OD_ADD: +2.25
OS_SPHERE: +1.25
OD_AXIS: 91
OS_AXIS: 88
OD_SPHERE: +1.25
OS_VA1: 20/20

## 2023-03-29 ASSESSMENT — KERATOMETRY
OS_K1POWER_DIOPTERS: 45.00
OS_AXISANGLE_DEGREES: 82
OD_K2POWER_DIOPTERS: 45.00
OS_K2POWER_DIOPTERS: 45.50
OD_AXISANGLE_DEGREES: 90
OD_K1POWER_DIOPTERS: 45.00

## 2023-03-29 ASSESSMENT — REFRACTION_AUTOREFRACTION
OD_AXIS: 91
OS_CYLINDER: -1.00
OD_CYLINDER: -1.25
OD_SPHERE: +1.75
OS_SPHERE: +1.25
OS_AXIS: 88

## 2023-03-29 ASSESSMENT — CONFRONTATIONAL VISUAL FIELD TEST (CVF)
OS_FINDINGS: FULL
OD_FINDINGS: FULL

## 2023-03-29 ASSESSMENT — AXIALLENGTH_DERIVED
OD_AL: 22.6424
OD_AL: 22.7325
OS_AL: 22.6926
OS_AL: 22.6476

## 2023-03-29 ASSESSMENT — LID POSITION - PTOSIS: OS_PTOSIS: 1+
